# Patient Record
Sex: MALE | Race: WHITE | Employment: FULL TIME | URBAN - METROPOLITAN AREA
[De-identification: names, ages, dates, MRNs, and addresses within clinical notes are randomized per-mention and may not be internally consistent; named-entity substitution may affect disease eponyms.]

---

## 2018-09-20 ENCOUNTER — OFFICE VISIT (OUTPATIENT)
Dept: PULMONOLOGY | Age: 48
End: 2018-09-20

## 2018-09-20 VITALS
OXYGEN SATURATION: 96 % | HEIGHT: 70 IN | DIASTOLIC BLOOD PRESSURE: 115 MMHG | SYSTOLIC BLOOD PRESSURE: 202 MMHG | HEART RATE: 94 BPM | RESPIRATION RATE: 18 BRPM | BODY MASS INDEX: 39.8 KG/M2 | WEIGHT: 278 LBS

## 2018-09-20 DIAGNOSIS — R06.02 SHORTNESS OF BREATH: Primary | ICD-10-CM

## 2018-09-20 DIAGNOSIS — Q85.9 HAMARTOMA OF LUNG (HCC): ICD-10-CM

## 2018-09-20 DIAGNOSIS — G47.33 OSA (OBSTRUCTIVE SLEEP APNEA): ICD-10-CM

## 2018-09-20 PROCEDURE — G8417 CALC BMI ABV UP PARAM F/U: HCPCS | Performed by: INTERNAL MEDICINE

## 2018-09-20 PROCEDURE — G8427 DOCREV CUR MEDS BY ELIG CLIN: HCPCS | Performed by: INTERNAL MEDICINE

## 2018-09-20 PROCEDURE — 99204 OFFICE O/P NEW MOD 45 MIN: CPT | Performed by: INTERNAL MEDICINE

## 2018-09-20 PROCEDURE — 4004F PT TOBACCO SCREEN RCVD TLK: CPT | Performed by: INTERNAL MEDICINE

## 2018-09-20 RX ORDER — METFORMIN HYDROCHLORIDE 500 MG/1
500 TABLET, EXTENDED RELEASE ORAL
COMMUNITY

## 2018-09-20 RX ORDER — ATORVASTATIN CALCIUM 10 MG/1
10 TABLET, FILM COATED ORAL DAILY
COMMUNITY

## 2018-09-20 RX ORDER — NIFEDIPINE 60 MG/1
90 TABLET, FILM COATED, EXTENDED RELEASE ORAL DAILY
COMMUNITY

## 2018-09-20 RX ORDER — LORAZEPAM 0.5 MG/1
0.5 TABLET ORAL EVERY 6 HOURS PRN
COMMUNITY

## 2018-09-20 NOTE — PROGRESS NOTES
HPI:  Patient is here for evaluation after recent ED visit with chest pain and shortness of breath  He does report history of chest pain with shortness of breath that happens every few months  He denies any fever, chills, diaphoresis, productive cough or hemoptysis  He does report some dyspnea on exertion  He did gain significant weight    He was seen initially 2011 for evaluation of RUL collapse. 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 Patient was seen by Dr. Jerri Patrick who performed a rigid bronchoscopy with removal of the tumor which turned out to be a Hamartoma. He did have repeat bronchoscopy twice 5-2011 and 3-2013 showed no recurrence. He did not follow-up since  He was noted to have obstructive sleep apnea but he is not being treated  He has been smoking on and off and he did report quitting smoking lately  His last CT chest  was read as:  CT CHEST, WITH INTRAVENOUS CONTRAST. DEC 13, 2012 10-48-56 AM-       INDICATION-  Pulmonary nodule, hamartoma.       COMPARISON- 12/5/2011.       FINDINGS-       The tracheobronchial airways are clear.       There is a 14 mm lymph node in the right hilum which is stable. There is a subcentimeter right paratracheal lymph node which is   unchanged.       In the left lower lobe there is a punctate 3 mm nodule which is   unchanged.       In the central aspect of the liver there is a 3 mm hypodensity   which is stable. Adrenal glands are normal. In the mid aspect of   the left kidney there is a simple 2.5 cm cyst. In the inferior   pole there is an incompletely visualized hypodensity which is   incompletely characterized. It measures 1.6 cm.       IMPRESSION-       Stable CT of the chest, since 12/5/2011.  Mildly enlarged right   hilar lymph node is unchanged, suggesting benign disease.       Stable 3 mm nodule in the superior segment of the left lower lobe,   likely old granuloma.       Simple cyst in the mid left

## 2018-09-20 NOTE — LETTER
[facility], [address] [facility], [address] [place] [address]  Phone: [phone]  Fax: [phone]      September 20, 2018       Patient: Oddis Cushing   MR Number: [id number]   YOB: 1970   Date of Visit: 9/20/2018       Dear Dr. Bert Gabriel saw Mr. Oddis Cushing today for evaluation. Below are the relevant portions of my assessment and plan of care. Assessment:     Diagnosis Orders   1. Shortness of breath     2. Hamartoma of lung ([address]     3. MARC (obstructive sleep apnea)           Plan:    1. I discussed with patient the above   2. His recent ED evaluation included chest x-ray and reported with no acute disease  3. I will repeat CT chest and full PFT for further evaluation. He might need repeat bronchoscopy as well for evaluation of his endobronchial tree  4. I encouraged him to pursue treatment for obstructive sleep apnea again  5. I encouraged him to continue smoking cessation  6. I recommended weight loss   7. RTC in 1 month      If you have questions, please do not hesitate to call me. I look forward to following Rich Ring along with you.     Sincerely,        Josi Vora MD    CC providers:  Tresa Zarco MD  VIA Facsimile: [phone]

## 2018-10-16 ENCOUNTER — HOSPITAL ENCOUNTER (OUTPATIENT)
Dept: PULMONOLOGY | Age: 48
Discharge: HOME | End: 2018-10-16
Payer: COMMERCIAL

## 2018-10-16 ENCOUNTER — HOSPITAL ENCOUNTER (OUTPATIENT)
Dept: CT IMAGING | Age: 48
Discharge: HOME | End: 2018-10-16
Payer: COMMERCIAL

## 2018-10-16 VITALS — OXYGEN SATURATION: 95 %

## 2018-10-16 DIAGNOSIS — R06.02 SHORTNESS OF BREATH: ICD-10-CM

## 2018-10-16 DIAGNOSIS — Q85.9 HAMARTOMA OF LUNG (HCC): ICD-10-CM

## 2018-10-16 PROCEDURE — 94726 PLETHYSMOGRAPHY LUNG VOLUMES: CPT

## 2018-10-16 PROCEDURE — 94664 DEMO&/EVAL PT USE INHALER: CPT

## 2018-10-16 PROCEDURE — 94729 DIFFUSING CAPACITY: CPT

## 2018-10-16 PROCEDURE — 94010 BREATHING CAPACITY TEST: CPT

## 2018-10-16 PROCEDURE — 71250 CT THORAX DX C-: CPT

## 2018-10-16 PROCEDURE — 94760 N-INVAS EAR/PLS OXIMETRY 1: CPT

## 2018-10-17 ENCOUNTER — TELEPHONE (OUTPATIENT)
Dept: PULMONOLOGY | Age: 48
End: 2018-10-17

## 2018-10-17 DIAGNOSIS — R91.1 PULMONARY NODULE: Primary | ICD-10-CM

## 2018-10-22 ENCOUNTER — OFFICE VISIT (OUTPATIENT)
Dept: PULMONOLOGY | Age: 48
End: 2018-10-22
Payer: COMMERCIAL

## 2018-10-22 ENCOUNTER — TELEPHONE (OUTPATIENT)
Dept: PULMONOLOGY | Age: 48
End: 2018-10-22

## 2018-10-22 VITALS
SYSTOLIC BLOOD PRESSURE: 154 MMHG | OXYGEN SATURATION: 98 % | WEIGHT: 283 LBS | HEIGHT: 70 IN | BODY MASS INDEX: 40.52 KG/M2 | HEART RATE: 103 BPM | RESPIRATION RATE: 18 BRPM | DIASTOLIC BLOOD PRESSURE: 98 MMHG

## 2018-10-22 DIAGNOSIS — G47.33 OSA (OBSTRUCTIVE SLEEP APNEA): ICD-10-CM

## 2018-10-22 DIAGNOSIS — Q85.9 HAMARTOMA OF LUNG (HCC): ICD-10-CM

## 2018-10-22 DIAGNOSIS — R91.1 PULMONARY NODULE: ICD-10-CM

## 2018-10-22 DIAGNOSIS — J44.9 COPD, MILD (HCC): Primary | ICD-10-CM

## 2018-10-22 PROCEDURE — 3023F SPIROM DOC REV: CPT | Performed by: INTERNAL MEDICINE

## 2018-10-22 PROCEDURE — G8484 FLU IMMUNIZE NO ADMIN: HCPCS | Performed by: INTERNAL MEDICINE

## 2018-10-22 PROCEDURE — G8417 CALC BMI ABV UP PARAM F/U: HCPCS | Performed by: INTERNAL MEDICINE

## 2018-10-22 PROCEDURE — 99214 OFFICE O/P EST MOD 30 MIN: CPT | Performed by: INTERNAL MEDICINE

## 2018-10-22 PROCEDURE — G8427 DOCREV CUR MEDS BY ELIG CLIN: HCPCS | Performed by: INTERNAL MEDICINE

## 2018-10-22 PROCEDURE — G8926 SPIRO NO PERF OR DOC: HCPCS | Performed by: INTERNAL MEDICINE

## 2018-10-22 PROCEDURE — 4004F PT TOBACCO SCREEN RCVD TLK: CPT | Performed by: INTERNAL MEDICINE

## 2018-10-22 RX ORDER — ALBUTEROL SULFATE 90 UG/1
2 AEROSOL, METERED RESPIRATORY (INHALATION) EVERY 6 HOURS PRN
Qty: 1 INHALER | Refills: 3 | Status: SHIPPED | OUTPATIENT
Start: 2018-10-22

## 2018-10-22 NOTE — LETTER
[facility]monary, [address] [facility], [address] [facility] [address]  Phone: [phone]  Fax: [phone]        October 22, 2018       Patient: Isidro Tyler   MR Number: [id number]   YOB: 1970   Date of Visit: 10/22/2018       Dear Dr. Shad Castillo saw Mr. Isidro Tyler today for follow-up visit. Below are the relevant portions of my assessment and plan of care. Assessment:     Diagnosis Orders   1. COPD, mild ([address]     2. MARC (obstructive sleep apnea)  [facility] Sleep Medicine - Alejandra Johnston MD   3. Hamartoma of lung ([facility] [address]     4. Pulmonary nodule       Plan:    1. I discussed with patient the above   2. I reviewed his PFT results and explained mild COPD changes  3. I will start Spiriva daily and use albuterol HFA as needed  4. He agreed to have his obstructive sleep apnea treated again, we will refer him to sleep medicine  5. I reviewed his CT chest and I explained findings on the monitor, will repeat CT chest in 6 months in regard to his small nodule. I did discuss with him screening bronchoscopy and he wants to hold on that at this time  6. I recommended weight loss       7. I had a lengthy discussion with patient regarding diagnosis and treatment plan. Over 25 minutes were spent during visit, half of which was face to face discussion and included counseling on current condition and treatment. RTC in 6 months      If you have questions, please do not hesitate to call me. I look forward to following Riri Calixto along with you.     Sincerely,        Valentina Merlin, MD    CC providers:  Ta Michelle MD  VIA Facsimile: [phone]

## 2018-10-22 NOTE — COMMUNICATION BODY
Assessment:     Assessment:     Diagnosis Orders   1. COPD, mild ([facility] [address] [facility]     2. MARC (obstructive sleep apnea)  [facility] Sleep Medicine - Lilibeth Rajput MD   3. Hamartoma of lung ([facility] [address] [facility]     4. Pulmonary nodule           Plan:     Plan:    1. I discussed with patient the above   2. I reviewed his PFT results and explained mild COPD changes  3. I will start Spiriva daily and use albuterol HFA as needed  4. He agreed to have his obstructive sleep apnea treated again, we will refer him to sleep medicine  5. I reviewed his CT chest and I explained findings on the monitor, will repeat CT chest in 6 months in regard to his small nodule. I did discuss with him screening bronchoscopy and he wants to hold on that at this time  6. I recommended weight loss   7. I had a lengthy discussion with patient regarding diagnosis and treatment plan. Over 25 minutes were spent during visit, half of which was face to face discussion and included counseling on current condition and treatment.   RTC in 6 months

## 2018-10-22 NOTE — PROGRESS NOTES
HPI:  Patient is here for follow-up visit, shortness of breath and MARC  He denies any worsening symptoms like shortness of breath, chest pain or cough  He denies any fever, chills, diaphoresis, productive cough or hemoptysis  He does report some dyspnea on exertion  His PFT showed mild obstructive changes   His repeat CT chest was read as  EXAMINATION:   CT OF THE CHEST WITHOUT CONTRAST 10/16/2018 8:40 am       TECHNIQUE:   CT of the chest was performed without the administration of intravenous   contrast. Multiplanar reformatted images are provided for review. Dose   modulation, iterative reconstruction, and/or weight based adjustment of the   mA/kV was utilized to reduce the radiation dose to as low as reasonably   achievable.       COMPARISON:   Prior studies most recent 12/13/2012       HISTORY:   ORDERING SYSTEM PROVIDED HISTORY: Shortness of breath   TECHNOLOGIST PROVIDED HISTORY:   Ordering Physician Provided Reason for Exam: Shortness of breath R06.02   (ICD-10-CM);  Hamartoma of lung   Acuity: Unknown   Type of Exam: Unknown       FINDINGS:   Mediastinum: Very subtle reticulation of anterior mediastinal fat at the   level of the aortic arch is again identified.  Finding is slightly less   pronounced when compared to the study of 12/13/2012. Deetta Charles may represent   minimal involuting residual thymic tissue.  No new mediastinal, hilar, or   axillary lymphadenopathy is identified. Alfie Gentleman is visualization of few normal   sized mediastinal lymph nodes which are generally similar in appearance when   compared to the study of 12/13/2012.  Largest mediastinal lymph node measures   approximately 8-9 mm in short axis (image 42), unchanged.  Thoracic aorta is   normal in caliber with no evidence of aneurysm.  Minimal fluid identified   within superior pericardial recess.  There is minimal pericardial   fluid/thickening in the region of the right cardiophrenic angle.  No focal   abnormality of the thyroid gland is he was transferred to [facility] for evaluation. Patient was seen by Dr. Paul Sarabia who performed a rigid bronchoscopy with removal of the tumor which turned out to be a Hamartoma. He did have repeat bronchoscopy twice 5-2011 and 3-2013 showed no recurrence. He was noted to have obstructive sleep apnea but he is not being treated  He has been smoking on and off and he did report quitting smoking 4-2018        Past Medical History:   Diagnosis Date    Hypercholesteremia     MARC (obstructive sleep apnea)     Pneumonia      Current Outpatient Prescriptions   Medication Sig Dispense Refill    atorvastatin (LIPITOR) 10 MG tablet Take 10 mg by mouth daily      metFORMIN (GLUCOPHAGE-XR) 500 MG extended release tablet Take 500 mg by mouth daily (with breakfast)      LORazepam (ATIVAN) 0.5 MG tablet Take 0.5 mg by mouth every 6 hours as needed for Anxiety. [employer] NIFEdipine (ADALAT CC) 60 MG extended release tablet Take 60 mg by mouth daily      Simvastatin (ZOCOR PO) Take 20 mg by mouth nightly.  sertraline (ZOLOFT) 50 MG tablet Take 100 mg by mouth daily. No current facility-administered medications for this visit. Review of Systems   Constitutional: Negative. Negative for fever, chills, diaphoresis, activity change, appetite change, fatigue and unexpected weight change. HENT: Negative. Negative for hearing loss, ear pain, nosebleeds, congestion, facial swelling, rhinorrhea, sneezing, neck pain, neck stiffness, postnasal drip, sinus pressure and ear discharge. Eyes: Negative. Negative for photophobia, pain, discharge, redness, itching and visual disturbance. Respiratory: As per HPI  Cardiovascular: Negative. Negative for chest pain, palpitations and leg swelling. Gastrointestinal: Negative. Negative for abdominal pain, blood in stool, abdominal distention and anal bleeding. Genitourinary: Negative.   Negative for dysuria, urgency, frequency, hematuria, decreased urine volume, enuresis

## 2019-06-12 ENCOUNTER — TELEPHONE (OUTPATIENT)
Dept: PULMONOLOGY | Age: 49
End: 2019-06-12

## 2019-06-12 NOTE — TELEPHONE ENCOUNTER
I spoke with the pt and he informed me that he is going to follow Dr Katja Slade at [facility] - he will have his CT elsewhere

## 2019-07-22 ENCOUNTER — OFFICE VISIT (OUTPATIENT)
Dept: PULMONOLOGY | Age: 49
End: 2019-07-22
Payer: COMMERCIAL

## 2019-07-22 VITALS
OXYGEN SATURATION: 99 % | WEIGHT: 270 LBS | HEART RATE: 100 BPM | DIASTOLIC BLOOD PRESSURE: 100 MMHG | HEIGHT: 71 IN | BODY MASS INDEX: 37.8 KG/M2 | SYSTOLIC BLOOD PRESSURE: 142 MMHG

## 2019-07-22 DIAGNOSIS — E66.2 CLASS 2 OBESITY WITH ALVEOLAR HYPOVENTILATION WITHOUT SERIOUS COMORBIDITY WITH BODY MASS INDEX (BMI) OF 37.0 TO 37.9 IN ADULT (HCC): Chronic | ICD-10-CM

## 2019-07-22 DIAGNOSIS — J44.9 COPD, MILD (HCC): Chronic | ICD-10-CM

## 2019-07-22 DIAGNOSIS — G47.33 OBSTRUCTIVE SLEEP APNEA (ADULT) (PEDIATRIC): Primary | ICD-10-CM

## 2019-07-22 DIAGNOSIS — I10 ESSENTIAL HYPERTENSION: Chronic | ICD-10-CM

## 2019-07-22 PROCEDURE — G8417 CALC BMI ABV UP PARAM F/U: HCPCS | Performed by: INTERNAL MEDICINE

## 2019-07-22 PROCEDURE — 3023F SPIROM DOC REV: CPT | Performed by: INTERNAL MEDICINE

## 2019-07-22 PROCEDURE — 99244 OFF/OP CNSLTJ NEW/EST MOD 40: CPT | Performed by: INTERNAL MEDICINE

## 2019-07-22 PROCEDURE — G8427 DOCREV CUR MEDS BY ELIG CLIN: HCPCS | Performed by: INTERNAL MEDICINE

## 2019-07-22 PROCEDURE — G8926 SPIRO NO PERF OR DOC: HCPCS | Performed by: INTERNAL MEDICINE

## 2019-07-22 ASSESSMENT — ENCOUNTER SYMPTOMS
EYE PAIN: 0
NAUSEA: 0
SHORTNESS OF BREATH: 1
ABDOMINAL PAIN: 0
RHINORRHEA: 0
ALLERGIC/IMMUNOLOGIC NEGATIVE: 1
ABDOMINAL DISTENTION: 0
CHOKING: 0
APNEA: 1
PHOTOPHOBIA: 0
VOMITING: 0
CHEST TIGHTNESS: 0

## 2019-07-22 ASSESSMENT — SLEEP AND FATIGUE QUESTIONNAIRES
HOW LIKELY ARE YOU TO NOD OFF OR FALL ASLEEP WHILE SITTING QUIETLY AFTER LUNCH WITHOUT ALCOHOL: 2
NECK CIRCUMFERENCE (INCHES): 19
ESS TOTAL SCORE: 16
HOW LIKELY ARE YOU TO NOD OFF OR FALL ASLEEP WHILE SITTING AND READING: 3
HOW LIKELY ARE YOU TO NOD OFF OR FALL ASLEEP WHILE LYING DOWN TO REST IN THE AFTERNOON WHEN CIRCUMSTANCES PERMIT: 3
HOW LIKELY ARE YOU TO NOD OFF OR FALL ASLEEP IN A CAR, WHILE STOPPED FOR A FEW MINUTES IN TRAFFIC: 1
HOW LIKELY ARE YOU TO NOD OFF OR FALL ASLEEP WHEN YOU ARE A PASSENGER IN A CAR FOR AN HOUR WITHOUT A BREAK: 2
HOW LIKELY ARE YOU TO NOD OFF OR FALL ASLEEP WHILE WATCHING TV: 2
HOW LIKELY ARE YOU TO NOD OFF OR FALL ASLEEP WHILE SITTING INACTIVE IN A PUBLIC PLACE: 2
HOW LIKELY ARE YOU TO NOD OFF OR FALL ASLEEP WHILE SITTING AND TALKING TO SOMEONE: 1

## 2019-07-22 NOTE — PROGRESS NOTES
sleep apnea)    COPD, mild (HCC)    Class 2 obesity with alveolar hypoventilation without serious comorbidity with body mass index (BMI) of 37.0 to 37.9 in adult [facility])       Past Medical History:   Diagnosis Date    Hypercholesteremia     MARC (obstructive sleep apnea)     Pneumonia        Past Surgical History:   Procedure Laterality Date    BRONCHOSCOPY  5/26/11    BRONCHOSCOPY  3/14/13    TONSILLECTOMY      TONSILLECTOMY      TUMOR REMOVAL  FEB 2011    LUNG       Family History   Problem Relation Age of Onset    Sleep Apnea Father        Review of Systems   Constitutional: Positive for fatigue. Negative for activity change and appetite change. HENT: Positive for congestion. Negative for nosebleeds, postnasal drip, rhinorrhea and sneezing. Eyes: Negative for photophobia, pain and visual disturbance. Respiratory: Positive for apnea and shortness of breath. Negative for choking and chest tightness. Cardiovascular: Negative. Gastrointestinal: Negative for abdominal distention, abdominal pain, nausea and vomiting. Endocrine: Negative for cold intolerance and heat intolerance. Genitourinary: Positive for frequency. Negative for difficulty urinating, dysuria and urgency. Musculoskeletal: Negative. Negative for neck pain and neck stiffness. Skin: Negative. Allergic/Immunologic: Negative. Neurological: Negative for tremors, seizures, syncope and weakness. Hematological: Negative for adenopathy. Does not bruise/bleed easily. Psychiatric/Behavioral: Positive for sleep disturbance. Negative for agitation, behavioral problems and confusion. Objective:     Vitals:  Weight BMI   Wt Readings from Last 3 Encounters:   07/22/19 270 lb (122.5 kg)   10/22/18 283 lb (128.4 kg)   09/20/18 278 lb (126.1 kg)    Body mass index is 37.66 kg/m².      BP HR SaO2   BP Readings from Last 3 Encounters:   07/22/19 (!) 142/100   10/22/18 (!) 154/98   09/20/18 (!) 202/115    Pulse Readings from Last submandibular and no tonsillar adenopathy present. Neurological: He is alert and oriented to person, place, and time. No cranial nerve deficit. Skin: Skin is warm, dry and intact. No cyanosis. Nails show no clubbing. Psychiatric: He has a normal mood and affect. His speech is normal and behavior is normal. Judgment and thought content normal.   Nursing note and vitals reviewed.       Electronically signed by Sheron Coleman MD on7/22/2019 at 3:25 PM

## 2019-08-26 ENCOUNTER — TELEPHONE (OUTPATIENT)
Dept: SLEEP CENTER | Age: 49
End: 2019-08-26

## 2019-11-01 ENCOUNTER — TELEPHONE (OUTPATIENT)
Dept: PULMONOLOGY | Age: 49
End: 2019-11-01

## 2019-12-19 ENCOUNTER — TELEPHONE (OUTPATIENT)
Dept: PULMONOLOGY | Age: 49
End: 2019-12-19

## 2020-06-08 ENCOUNTER — VIRTUAL VISIT (OUTPATIENT)
Dept: PULMONOLOGY | Age: 50
End: 2020-06-08
Payer: COMMERCIAL

## 2020-06-08 PROCEDURE — G8427 DOCREV CUR MEDS BY ELIG CLIN: HCPCS | Performed by: NURSE PRACTITIONER

## 2020-06-08 PROCEDURE — 99214 OFFICE O/P EST MOD 30 MIN: CPT | Performed by: NURSE PRACTITIONER

## 2020-06-08 ASSESSMENT — SLEEP AND FATIGUE QUESTIONNAIRES
ESS TOTAL SCORE: 10
HOW LIKELY ARE YOU TO NOD OFF OR FALL ASLEEP WHILE SITTING INACTIVE IN A PUBLIC PLACE: 1
HOW LIKELY ARE YOU TO NOD OFF OR FALL ASLEEP IN A CAR, WHILE STOPPED FOR A FEW MINUTES IN TRAFFIC: 0
HOW LIKELY ARE YOU TO NOD OFF OR FALL ASLEEP WHEN YOU ARE A PASSENGER IN A CAR FOR AN HOUR WITHOUT A BREAK: 2
HOW LIKELY ARE YOU TO NOD OFF OR FALL ASLEEP WHILE LYING DOWN TO REST IN THE AFTERNOON WHEN CIRCUMSTANCES PERMIT: 2
HOW LIKELY ARE YOU TO NOD OFF OR FALL ASLEEP WHILE WATCHING TV: 1
HOW LIKELY ARE YOU TO NOD OFF OR FALL ASLEEP WHILE SITTING AND TALKING TO SOMEONE: 1
HOW LIKELY ARE YOU TO NOD OFF OR FALL ASLEEP WHILE SITTING AND READING: 2
HOW LIKELY ARE YOU TO NOD OFF OR FALL ASLEEP WHILE SITTING QUIETLY AFTER LUNCH WITHOUT ALCOHOL: 1

## 2020-06-08 NOTE — PROGRESS NOTES
of this patients condition.     EILGIO Woody CNP      NPI: [id number]       Order Signed Date: 06/08/20    Electronically signed by ELIGIO Woody CNP on 6/8/2020 at 2:16 PM

## 2020-06-08 NOTE — PROGRESS NOTES
Ene Mendoza MD, FAASM, [facility]  Jonathan Shea, MSN, RN, CNP     [address] [facility] SLE[facility]  [address] [address]  Dept: [phone]  Dept Fax: : [facility]  [address] [place] [id number] [phone]    Subjective:     Patient ID: Carmelina Anderson is a 51 y.o. male. Chief Complaint   Patient presents with    Sleep Apnea       HPI:      Sleep Medicine Video Visit    Pursuant to the emergency declaration under the [facility], Critical access hospital waiver authority and the [employer] Act this Telephone Visit was insisted, with patient's consent, to reduce the patient's risk of exposure to COVID-19 and provide continuity of care for an established patient. Services were provided through a synchronous discussion over a telephone and/or Video chat to substitute for in-person clinic visit, and coded as such. [place] - Total score: 10    Follow-up :     Last Visit : July 2019    Did not follow through with the titration due to cost and did not get a new machine       Patient reports the listed chronic Co-morbidities: Tobacco abuse, HTN, COPD, Obesity    are well controlled and stable at this time. Subjective Health Changes: None      Over Night Oximetry: [] Yes  [] No  [x] NA [] WNL   Using O2: [] Yes  [] No  [x] NA   Patient is compliant with the machine  [] Yes  [x] No   Feeling rested when using the machine  NA     Pressure is comfortable with inspiration and expiration  NA     Noticed changes in pressure   [] Yes  [] No  [x] NA   Mask is fitting well  NA   Noting Mask Air Leak NA   Having painful Aerophagia  NA   Nocturia   1-2  per night.    Having  HA upon waking  [] Yes  [x] No   Dry mouth upon waking   Dry Nose  Dry Eyes  [] Yes  [x] No   Congestion upon waking   [] Yes  [x] No    Nose Bleeds  [] Yes  [x] No   Using Sleep Aides  Ativan per PMD   Understands how to change humidification and/or tubing temperature for comfort while at home  NA     Difficulties falling asleep  [] Yes  [x] No   Difficulties staying asleep  [] Yes  [x] No   Approximate time to bed  12am   Approximate wake time  6-6:30am   Taking Naps  no   If taking naps usual length    [x] NA   If taking naps using the machine  [] Yes  [] No  [x] NA [] With and With out    Drowsy when driving  [] Yes  [x] No     Does patient carry a DOT/CDL  [] Yes  [x] No     Does patient carry FAA/[employer] License   [] Yes  [x] No      Any concerns noted with the machine at this time  [] Yes  [x] No        Diagnosis Orders   1. MARC (obstructive sleep apnea)     2. Essential hypertension     3. Tobacco abuse     4. Class 2 obesity with alveolar hypoventilation without serious comorbidity with body mass index (BMI) of 37.0 to 37.9 in adult ([address]     5. COPD, mild ([address]         The chronic medical conditions listed are directly related to the primary diagnosis listed above. The management of the primary diagnosis affects the secondary diagnosis and vice versa. Assessment/Plan:     Essential hypertension  Chronic- Stable. Cont meds per PCP and other physicians. Tobacco abuse  Chronic- Stable. Cont meds per PCP and other physicians. Class 2 obesity with alveolar hypoventilation without serious comorbidity with body mass index (BMI) of 37.0 to 37.9 in adult (HCC)  Chronic-Stable. Encouraged him to work on weight loss through diet and exercise. COPD, mild ([address]  Chronic- Stable. Cont meds per PCP and other physicians. MRAC (obstructive sleep apnea)  Not treating needs treatment       The primary encounter diagnosis was MARC (obstructive sleep apnea).  Diagnoses of Essential hypertension, Tobacco abuse, Class 2 obesity with alveolar hypoventilation without serious comorbidity with body mass

## 2020-08-27 ENCOUNTER — TELEPHONE (OUTPATIENT)
Dept: PULMONOLOGY | Age: 50
End: 2020-08-27

## 2020-09-11 ENCOUNTER — TELEPHONE (OUTPATIENT)
Dept: PULMONOLOGY | Age: 50
End: 2020-09-11

## 2020-09-11 NOTE — TELEPHONE ENCOUNTER
Called the patient and left a message to inform him that I spoke to the DME this morning and they would be reaching out to him directly to supply the machine which was ordered, rather then the machine he was provided.